# Patient Record
Sex: FEMALE | Race: WHITE | Employment: FULL TIME | URBAN - METROPOLITAN AREA
[De-identification: names, ages, dates, MRNs, and addresses within clinical notes are randomized per-mention and may not be internally consistent; named-entity substitution may affect disease eponyms.]

---

## 2017-11-29 LAB
HPV ABSTRACT: NORMAL
PAP-ABSTRACT: NORMAL

## 2020-05-20 ENCOUNTER — TRANSFERRED RECORDS (OUTPATIENT)
Dept: MULTI SPECIALTY CLINIC | Facility: CLINIC | Age: 65
End: 2020-05-20

## 2020-05-20 LAB
ALT SERPL-CCNC: 14 IU/L (ref 8–45)
AST SERPL-CCNC: 23 IU/L (ref 2–40)
CHOLEST SERPL-MCNC: 262 MG/DL (ref 100–199)
CREAT SERPL-MCNC: 1.06 MG/DL (ref 0.57–1.11)
GFR SERPL CREATININE-BSD FRML MDRD: 52 ML/MIN/1.73M2
GLUCOSE SERPL-MCNC: 100 MG/DL (ref 65–100)
HBA1C MFR BLD: 5.7 % (ref 0–5.7)
HDLC SERPL-MCNC: 47 MG/DL
LDLC SERPL CALC-MCNC: 149 MG/DL
NONHDLC SERPL-MCNC: 215 MG/DL
POTASSIUM SERPL-SCNC: 4.4 MMOL/L (ref 3.5–5)
TRIGL SERPL-MCNC: 332 MG/DL
TSH SERPL-ACNC: 2.05 UIU/ML (ref 0.35–4.94)

## 2020-09-08 NOTE — PROGRESS NOTES
SUBJECTIVE:                                                   Swapna Hampton is a 65 year old female who presents to clinic today for the following health issue(s):  Patient presents with:  Vaginal Problem: vaginal burning during IC      HPI:  Pt here today with c/o vaginal burning with IC. She states it's been present for 1 month. Denies daily itching or burning. Denies any discharge or odor.   She does not have any vaginal bleeding. Not on any hRT at this time.   She does not use sexual lubrincants-doesn't feel she needs them.     Denies any bladder changes.     No LMP recorded..     Patient is sexually active, No obstetric history on file..  Using menopause for contraception.    reports that she has quit smoking. She has never used smokeless tobacco.    STD testing offered?  Declined    Health maintenance updated:  Due for mammogram    Today's PHQ-2 Score: No flowsheet data found.  Today's PHQ-9 Score:   PHQ-9 SCORE 9/10/2020   PHQ-9 Total Score 2     Today's BIBIANA-7 Score:   BIBIANA-7 SCORE 9/10/2020   Total Score 2       Problem list and histories reviewed & adjusted, as indicated.  Additional history: as documented.    There is no problem list on file for this patient.    Past Surgical History:   Procedure Laterality Date      SECTION        Social History     Tobacco Use     Smoking status: Former Smoker     Smokeless tobacco: Never Used   Substance Use Topics     Alcohol use: Yes      Problem (# of Occurrences) Relation (Name,Age of Onset)    Heart Disease (3) Father, Maternal Grandmother, Paternal Grandmother    Skin Cancer (1) Maternal Grandfather    Thyroid Cancer (1) Mother            Current Outpatient Medications   Medication Sig     LEVOTHYROXINE SODIUM PO      LIOTHYRONINE SODIUM PO      metFORMIN (GLUCOPHAGE-XR) 500 MG 24 hr tablet Take 1,000 mg by mouth     No current facility-administered medications for this visit.      Allergies   Allergen Reactions     Penicillins Anaphylaxis     PN:  "LW Reaction: sleepy       Minoxidil Hives     PN: lost all hair on scalp and body, hives     Succinylcholine Other (See Comments)     Extreme difficultly waking         ROS:  12 point review of systems negative other than symptoms noted below or in the HPI.  Genitourinary: vaginal burning  No urinary frequency or dysuria, bladder or kidney problems, POSITIVE for:, vaginal itching or burning      OBJECTIVE:     /64   Pulse 72   Ht 1.537 m (5' 0.5\")   Wt 70.8 kg (156 lb)   BMI 29.97 kg/m    Body mass index is 29.97 kg/m .    Exam:  Constitutional:  Appearance: Well nourished, well developed alert, in no acute distress  Psychiatric:  Mentation appears normal and affect normal/bright.  Pelvic Exam:  External Genitalia:     Normal appearance for age, no discharge present, no tenderness present, no inflammatory lesions present, color normal  Vagina:     Normal vaginal vault without central or paravaginal defects, MINIMAL THIN WHITE discharge present, no inflammatory lesions present, no masses present-AREA OF REDNESS JUST INSIDE THE INTROITUS TO THE RIGHT. PT VALIDATES THIS IS WHERE HER IRRITATION IS.   Bladder:     Nontender to palpation  Urethra:   Urethral Body:  Urethra palpation normal, urethra structural support normal   Urethral Meatus:  No erythema or lesions present  Cervix:     Appearance healthy, no lesions present, nontender to palpation, no bleeding present  Uterus:     Uterus: firm, normal sized and nontender, midplane in position.   Adnexa:     No adnexal tenderness present, no adnexal masses present  Perineum:     Perineum within normal limits, no evidence of trauma, no rashes or skin lesions present  Anus:     Anus within normal limits, no hemorrhoids present  Inguinal Lymph Nodes:     No lymphadenopathy present  Pubic Hair:     Normal pubic hair distribution for age  Genitalia and Groin:     No rashes present, no lesions present, no areas of discoloration, no masses present       In-Clinic Test " Results:  Results for orders placed or performed in visit on 09/10/20 (from the past 24 hour(s))   Wet prep    Specimen: Vagina   Result Value Ref Range    Specimen Description Vagina     Wet Prep No Trichomonas seen     Wet Prep No clue cells seen     Wet Prep Moderate  WBC'S seen       Wet Prep No yeast seen        ASSESSMENT/PLAN:                                                        ICD-10-CM    1. Vaginal pain  R10.2 Wet prep     Strep, Group B by PCR       There are no Patient Instructions on file for this visit.    Wet prep: +WBCs-no treatment until GBS is back. Recommended replense PRN.   Will send GBS  Vag atrophy and vag E2 cream discussed.       TRISH Turner [facility] FOR [facility]

## 2020-09-10 ENCOUNTER — OFFICE VISIT (OUTPATIENT)
Dept: OBGYN | Facility: CLINIC | Age: 65
End: 2020-09-10
Payer: COMMERCIAL

## 2020-09-10 VITALS
DIASTOLIC BLOOD PRESSURE: 64 MMHG | WEIGHT: 156 LBS | SYSTOLIC BLOOD PRESSURE: 118 MMHG | BODY MASS INDEX: 29.45 KG/M2 | HEART RATE: 72 BPM | HEIGHT: 61 IN

## 2020-09-10 DIAGNOSIS — R10.2 VAGINAL PAIN: Primary | ICD-10-CM

## 2020-09-10 LAB
SPECIMEN SOURCE: NORMAL
WET PREP SPEC: NORMAL

## 2020-09-10 PROCEDURE — 99202 OFFICE O/P NEW SF 15 MIN: CPT | Performed by: NURSE PRACTITIONER

## 2020-09-10 PROCEDURE — 87653 STREP B DNA AMP PROBE: CPT | Performed by: NURSE PRACTITIONER

## 2020-09-10 PROCEDURE — 87210 SMEAR WET MOUNT SALINE/INK: CPT | Performed by: NURSE PRACTITIONER

## 2020-09-10 RX ORDER — METFORMIN HCL 500 MG
1000 TABLET, EXTENDED RELEASE 24 HR ORAL
COMMUNITY
Start: 2020-05-06

## 2020-09-10 ASSESSMENT — ANXIETY QUESTIONNAIRES
IF YOU CHECKED OFF ANY PROBLEMS ON THIS QUESTIONNAIRE, HOW DIFFICULT HAVE THESE PROBLEMS MADE IT FOR YOU TO DO YOUR WORK, TAKE CARE OF THINGS AT HOME, OR GET ALONG WITH OTHER PEOPLE: NOT DIFFICULT AT ALL
GAD7 TOTAL SCORE: 2
7. FEELING AFRAID AS IF SOMETHING AWFUL MIGHT HAPPEN: NOT AT ALL
6. BECOMING EASILY ANNOYED OR IRRITABLE: NOT AT ALL
5. BEING SO RESTLESS THAT IT IS HARD TO SIT STILL: NOT AT ALL
3. WORRYING TOO MUCH ABOUT DIFFERENT THINGS: SEVERAL DAYS
1. FEELING NERVOUS, ANXIOUS, OR ON EDGE: NOT AT ALL
2. NOT BEING ABLE TO STOP OR CONTROL WORRYING: SEVERAL DAYS

## 2020-09-10 ASSESSMENT — PATIENT HEALTH QUESTIONNAIRE - PHQ9
SUM OF ALL RESPONSES TO PHQ QUESTIONS 1-9: 2
5. POOR APPETITE OR OVEREATING: NOT AT ALL

## 2020-09-10 ASSESSMENT — MIFFLIN-ST. JEOR: SCORE: 1182.05

## 2020-09-10 NOTE — Clinical Note
Please abstract the following data from this visit with this patient into the appropriate field in Epic:        Other Tests found in the patient's chart through Chart Review/Care Everywhere:    Mammogram done by this [facility] this date: 8/6/19 and Pap smear done by this [facility] this date: 11/29/17    Note to [employer]st[employer]: If this section is blank, no results were found via Chart Review/Care Everywhere.

## 2020-09-10 NOTE — NURSING NOTE
Please abstract the following data from this visit with this patient into the appropriate field in Epic:        Other Tests found in the patient's chart through Chart Review/Care Everywhere:    Mammogram done by this [facility] this date: 8/6/19 and Pap smear done by this [facility] this date: 11/29/17    Note to [employer]: If this section is blank, no results were found via Chart Review/Care Everywhere.

## 2020-09-11 LAB
GP B STREP DNA SPEC QL NAA+PROBE: NEGATIVE
SPECIMEN SOURCE: NORMAL

## 2020-09-11 ASSESSMENT — ANXIETY QUESTIONNAIRES: GAD7 TOTAL SCORE: 2
